# Patient Record
Sex: FEMALE | Race: WHITE | URBAN - NONMETROPOLITAN AREA
[De-identification: names, ages, dates, MRNs, and addresses within clinical notes are randomized per-mention and may not be internally consistent; named-entity substitution may affect disease eponyms.]

---

## 2021-10-26 ENCOUNTER — WEB ENCOUNTER (OUTPATIENT)
Dept: URBAN - NONMETROPOLITAN AREA CLINIC 13 | Facility: CLINIC | Age: 61
End: 2021-10-26

## 2021-10-26 ENCOUNTER — OFFICE VISIT (OUTPATIENT)
Dept: URBAN - NONMETROPOLITAN AREA CLINIC 13 | Facility: CLINIC | Age: 61
End: 2021-10-26
Payer: COMMERCIAL

## 2021-10-26 DIAGNOSIS — R74.8 ABNORMAL LIVER ENZYMES: ICD-10-CM

## 2021-10-26 PROCEDURE — 99244 OFF/OP CNSLTJ NEW/EST MOD 40: CPT | Performed by: INTERNAL MEDICINE

## 2021-10-26 PROCEDURE — 99204 OFFICE O/P NEW MOD 45 MIN: CPT | Performed by: INTERNAL MEDICINE

## 2021-10-26 RX ORDER — TICAGRELOR 90 MG/1
TABLET ORAL
Qty: 60 | Status: ACTIVE | COMMUNITY

## 2021-10-26 RX ORDER — METOPROLOL TARTRATE 25 MG/1
TABLET ORAL
Qty: 30 | Status: ACTIVE | COMMUNITY

## 2021-10-26 RX ORDER — ATORVASTATIN CALCIUM 80 MG/1
TABLET, FILM COATED ORAL
Qty: 30 | Status: ACTIVE | COMMUNITY

## 2021-10-26 NOTE — HPI-TODAY'S VISIT:
Ms. Bennett  is a very pleasant new patient referred by Dr. Ruiz for elevated liver test.  A copy will be sent to his office.  She also sees Dr. Heath Hilario with cardiology.  She is here for elevated liver test.  She recently had cardiac intervention with stent placement.  She was placed on a routine cardiac regimen.  She was then told that her liver tests were elevated.  Dr. Hilario actually took her off her statin and referred her to me to evaluate her liver test.  She states that she has had liver test elevations in the past.  She has never been on these were elevated.  She denies any alcohol use.  She denies any jaundice.  She denies any family history of liver disease other than alcohol-related problems.

## 2022-02-01 ENCOUNTER — OFFICE VISIT (OUTPATIENT)
Dept: URBAN - NONMETROPOLITAN AREA CLINIC 13 | Facility: CLINIC | Age: 62
End: 2022-02-01

## 2022-03-25 ENCOUNTER — WEB ENCOUNTER (OUTPATIENT)
Dept: URBAN - NONMETROPOLITAN AREA CLINIC 13 | Facility: CLINIC | Age: 62
End: 2022-03-25

## 2022-03-29 ENCOUNTER — WEB ENCOUNTER (OUTPATIENT)
Dept: URBAN - NONMETROPOLITAN AREA CLINIC 13 | Facility: CLINIC | Age: 62
End: 2022-03-29

## 2022-03-31 ENCOUNTER — OFFICE VISIT (OUTPATIENT)
Dept: URBAN - NONMETROPOLITAN AREA CLINIC 13 | Facility: CLINIC | Age: 62
End: 2022-03-31
Payer: COMMERCIAL

## 2022-03-31 ENCOUNTER — DASHBOARD ENCOUNTERS (OUTPATIENT)
Age: 62
End: 2022-03-31

## 2022-03-31 DIAGNOSIS — R79.89 ELEVATED LIVER FUNCTION TESTS: ICD-10-CM

## 2022-03-31 PROCEDURE — 99213 OFFICE O/P EST LOW 20 MIN: CPT | Performed by: INTERNAL MEDICINE

## 2022-03-31 RX ORDER — ROSUVASTATIN CALCIUM 20 MG/1
1 TABLET TABLET, FILM COATED ORAL ONCE A DAY
Status: ACTIVE | COMMUNITY

## 2022-03-31 RX ORDER — CLOPIDOGREL 75 MG/1
1 TABLET TABLET, FILM COATED ORAL ONCE A DAY
Status: ACTIVE | COMMUNITY

## 2022-03-31 RX ORDER — METOPROLOL TARTRATE 25 MG/1
1 TABLET WITH FOOD TABLET ORAL QD
Status: ACTIVE | COMMUNITY

## 2022-03-31 NOTE — HPI-TODAY'S VISIT:
Ms. Bennett  is a very pleasant new patient referred by Dr. Ruiz for elevated liver test.  A copy will be sent to his office.  She also sees Dr. Heath Hilario with cardiology.  She is here for elevated liver test.  She recently had cardiac intervention with stent placement.  She was placed on a routine cardiac regimen.  She was then told that her liver tests were elevated.  Dr. Hilario actually took her off her statin and referred her to me to evaluate her liver test.  She states that she has had liver test elevations in the past.  She has never been on these were elevated.  She denies any alcohol use.  She denies any jaundice.  She denies any family history of liver disease other than alcohol-related problems. 3/31/2022 Munir returns for follow-up for elevated liver test.  When I checked her labs in October her liver transaminases were normal.  All of her liver tests were also unrevealing.  Ultrasound of the liver did not show any hepatomegaly or significant steatosis.  She otherwise has been doing well.  Unfortunately due to some ongoing cardiac issues she had a cardiac cath and was told by Dr. Schwarz that she needed coronary artery bypass grafting.  She is scheduled to see a physician at [facility] soon.